# Patient Record
Sex: FEMALE | Race: WHITE | HISPANIC OR LATINO | URBAN - METROPOLITAN AREA
[De-identification: names, ages, dates, MRNs, and addresses within clinical notes are randomized per-mention and may not be internally consistent; named-entity substitution may affect disease eponyms.]

---

## 2021-05-04 ENCOUNTER — APPOINTMENT (RX ONLY)
Dept: URBAN - METROPOLITAN AREA CLINIC 123 | Facility: CLINIC | Age: 46
Setting detail: DERMATOLOGY
End: 2021-05-04

## 2021-05-04 DIAGNOSIS — B07.8 OTHER VIRAL WARTS: ICD-10-CM

## 2021-05-04 PROCEDURE — 17110 DESTRUCTION B9 LES UP TO 14: CPT

## 2021-05-04 PROCEDURE — ? FULL BODY SKIN EXAM - DECLINED

## 2021-05-04 PROCEDURE — ? ADDITIONAL NOTES

## 2021-05-04 PROCEDURE — ? COUNSELING

## 2021-05-04 PROCEDURE — ? LIQUID NITROGEN

## 2021-05-04 ASSESSMENT — LOCATION DETAILED DESCRIPTION DERM
LOCATION DETAILED: LEFT MID RADIAL DORSAL INDEX FINGER
LOCATION DETAILED: PERIUNGUAL SKIN RIGHT MIDDLE FINGER
LOCATION DETAILED: PERIUNGUAL SKIN LEFT THUMB
LOCATION DETAILED: PERIUNGUAL SKIN RIGHT THUMB
LOCATION DETAILED: LEFT DISTAL DORSAL MIDDLE FINGER
LOCATION DETAILED: LEFT DISTAL DORSAL SMALL FINGER
LOCATION DETAILED: RIGHT DISTAL ULNAR DORSAL RING FINGER
LOCATION DETAILED: LEFT DISTAL DORSAL RING FINGER

## 2021-05-04 ASSESSMENT — LOCATION ZONE DERM: LOCATION ZONE: FINGER

## 2021-05-04 ASSESSMENT — LOCATION SIMPLE DESCRIPTION DERM
LOCATION SIMPLE: RIGHT MIDDLE FINGER
LOCATION SIMPLE: LEFT INDEX FINGER
LOCATION SIMPLE: LEFT RING FINGER
LOCATION SIMPLE: RIGHT THUMB
LOCATION SIMPLE: RIGHT RING FINGER
LOCATION SIMPLE: LEFT THUMB
LOCATION SIMPLE: LEFT MIDDLE FINGER
LOCATION SIMPLE: LEFT SMALL FINGER

## 2021-05-25 ENCOUNTER — APPOINTMENT (RX ONLY)
Dept: URBAN - METROPOLITAN AREA CLINIC 123 | Facility: CLINIC | Age: 46
Setting detail: DERMATOLOGY
End: 2021-05-25

## 2021-05-25 DIAGNOSIS — B07.8 OTHER VIRAL WARTS: ICD-10-CM

## 2021-05-25 PROCEDURE — ? COUNSELING

## 2021-05-25 PROCEDURE — ? ADDITIONAL NOTES

## 2021-05-25 PROCEDURE — 17110 DESTRUCTION B9 LES UP TO 14: CPT

## 2021-05-25 PROCEDURE — ? FULL BODY SKIN EXAM - DECLINED

## 2021-05-25 PROCEDURE — ? LIQUID NITROGEN

## 2021-05-25 ASSESSMENT — LOCATION SIMPLE DESCRIPTION DERM
LOCATION SIMPLE: RIGHT MIDDLE FINGER
LOCATION SIMPLE: RIGHT THUMB
LOCATION SIMPLE: LEFT SMALL FINGER
LOCATION SIMPLE: RIGHT RING FINGER
LOCATION SIMPLE: RIGHT SMALL FINGER
LOCATION SIMPLE: RIGHT MIDDLE FINGERNAIL

## 2021-05-25 ASSESSMENT — LOCATION DETAILED DESCRIPTION DERM
LOCATION DETAILED: PERIUNGUAL SKIN RIGHT THUMB
LOCATION DETAILED: PERIUNGUAL SKIN RIGHT MIDDLE FINGER
LOCATION DETAILED: RIGHT DISTAL RADIAL DORSAL SMALL FINGER
LOCATION DETAILED: RIGHT MIDDLE FINGERNAIL
LOCATION DETAILED: LEFT DISTAL DORSAL SMALL FINGER
LOCATION DETAILED: RIGHT DISTAL ULNAR DORSAL RING FINGER

## 2021-05-25 ASSESSMENT — LOCATION ZONE DERM
LOCATION ZONE: FINGER
LOCATION ZONE: FINGERNAIL

## 2021-05-25 NOTE — PROCEDURE: LIQUID NITROGEN
Pared With?: 15 blade
Render Note In Bullet Format When Appropriate: No
Medical Necessity Clause: This procedure was medically necessary because the lesions that were treated were:
Medical Necessity Information: It is in your best interest to select a reason for this procedure from the list below. All of these items fulfill various CMS LCD requirements except the new and changing color options.
Post-Care Instructions: I reviewed with the patient in detail post-care instructions. Patient is to wear sunprotection, and avoid picking at any of the treated lesions. Pt may apply Vaseline to crusted or scabbing areas.
Detail Level: Detailed
Consent: The patient's consent was obtained including but not limited to risks of crusting, scabbing, blistering, scarring, darker or lighter pigmentary change, recurrence, incomplete removal and infection.

## 2021-06-22 ENCOUNTER — APPOINTMENT (RX ONLY)
Dept: URBAN - METROPOLITAN AREA CLINIC 123 | Facility: CLINIC | Age: 46
Setting detail: DERMATOLOGY
End: 2021-06-22

## 2021-06-22 DIAGNOSIS — B07.8 OTHER VIRAL WARTS: ICD-10-CM

## 2021-06-22 PROCEDURE — ? LIQUID NITROGEN

## 2021-06-22 PROCEDURE — ? FULL BODY SKIN EXAM - DECLINED

## 2021-06-22 PROCEDURE — ? COUNSELING

## 2021-06-22 PROCEDURE — 17110 DESTRUCTION B9 LES UP TO 14: CPT

## 2021-06-22 PROCEDURE — ? ADDITIONAL NOTES

## 2021-06-22 ASSESSMENT — LOCATION ZONE DERM: LOCATION ZONE: FINGER

## 2021-06-22 ASSESSMENT — LOCATION DETAILED DESCRIPTION DERM
LOCATION DETAILED: RIGHT DISTAL RADIAL DORSAL SMALL FINGER
LOCATION DETAILED: RIGHT DISTAL DORSAL MIDDLE FINGER
LOCATION DETAILED: RIGHT DISTAL DORSAL RING FINGER
LOCATION DETAILED: LEFT DISTAL RADIAL DORSAL SMALL FINGER
LOCATION DETAILED: PERIUNGUAL SKIN RIGHT THUMB
LOCATION DETAILED: PERIUNGUAL SKIN RIGHT MIDDLE FINGER
LOCATION DETAILED: RIGHT DISTAL RADIAL DORSAL RING FINGER

## 2021-06-22 ASSESSMENT — LOCATION SIMPLE DESCRIPTION DERM
LOCATION SIMPLE: RIGHT THUMB
LOCATION SIMPLE: LEFT SMALL FINGER
LOCATION SIMPLE: RIGHT RING FINGER
LOCATION SIMPLE: RIGHT SMALL FINGER
LOCATION SIMPLE: RIGHT MIDDLE FINGER

## 2021-07-22 ENCOUNTER — APPOINTMENT (RX ONLY)
Dept: URBAN - METROPOLITAN AREA CLINIC 123 | Facility: CLINIC | Age: 46
Setting detail: DERMATOLOGY
End: 2021-07-22

## 2021-07-22 DIAGNOSIS — B07.8 OTHER VIRAL WARTS: ICD-10-CM

## 2021-07-22 PROCEDURE — ? FULL BODY SKIN EXAM - DECLINED

## 2021-07-22 PROCEDURE — ? ADDITIONAL NOTES

## 2021-07-22 PROCEDURE — 17110 DESTRUCTION B9 LES UP TO 14: CPT

## 2021-07-22 PROCEDURE — ? LIQUID NITROGEN

## 2021-07-22 PROCEDURE — ? COUNSELING

## 2021-07-22 ASSESSMENT — LOCATION SIMPLE DESCRIPTION DERM
LOCATION SIMPLE: RIGHT SMALL FINGER
LOCATION SIMPLE: RIGHT THUMB
LOCATION SIMPLE: RIGHT RING FINGERNAIL
LOCATION SIMPLE: LEFT SMALL FINGERNAIL
LOCATION SIMPLE: RIGHT RING FINGER
LOCATION SIMPLE: RIGHT MIDDLE FINGER
LOCATION SIMPLE: LEFT MIDDLE FINGERNAIL
LOCATION SIMPLE: RIGHT MIDDLE FINGERNAIL

## 2021-07-22 ASSESSMENT — LOCATION DETAILED DESCRIPTION DERM
LOCATION DETAILED: LEFT MIDDLE FINGERNAIL
LOCATION DETAILED: PERIUNGUAL SKIN RIGHT THUMB
LOCATION DETAILED: RIGHT RING FINGERNAIL
LOCATION DETAILED: RIGHT DISTAL DORSAL MIDDLE FINGER
LOCATION DETAILED: RIGHT DISTAL DORSAL SMALL FINGER
LOCATION DETAILED: RIGHT MIDDLE FINGERNAIL
LOCATION DETAILED: PERIUNGUAL SKIN RIGHT RING FINGER
LOCATION DETAILED: LEFT SMALL FINGERNAIL

## 2021-07-22 ASSESSMENT — LOCATION ZONE DERM
LOCATION ZONE: FINGERNAIL
LOCATION ZONE: FINGER

## 2021-07-22 NOTE — PROCEDURE: LIQUID NITROGEN
Include Z78.9 (Other Specified Conditions Influencing Health Status) As An Associated Diagnosis?: No
Show Aperture Variable?: Yes
Duration Of Freeze Thaw-Cycle (Seconds): 10-15
Post-Care Instructions: I reviewed with the patient in detail post-care instructions. Patient is to wear sunprotection, and avoid picking at any of the treated lesions. Pt may apply Vaseline to crusted or scabbing areas.
Medical Necessity Clause: This procedure was medically necessary because the lesions that were treated were:
Number Of Freeze-Thaw Cycles: 3 freeze-thaw cycles
Medical Necessity Information: It is in your best interest to select a reason for this procedure from the list below. All of these items fulfill various CMS LCD requirements except the new and changing color options.
Pared With?: 15 blade
Consent: The patient's consent was obtained including but not limited to risks of crusting, scabbing, blistering, scarring, darker or lighter pigmentary change, recurrence, incomplete removal and infection.
Detail Level: Detailed

## 2021-09-09 ENCOUNTER — APPOINTMENT (RX ONLY)
Dept: URBAN - METROPOLITAN AREA CLINIC 123 | Facility: CLINIC | Age: 46
Setting detail: DERMATOLOGY
End: 2021-09-09

## 2021-09-09 DIAGNOSIS — B07.8 OTHER VIRAL WARTS: ICD-10-CM | Status: IMPROVED

## 2021-09-09 PROCEDURE — ? LIQUID NITROGEN

## 2021-09-09 PROCEDURE — 17110 DESTRUCTION B9 LES UP TO 14: CPT

## 2021-09-09 PROCEDURE — ? COUNSELING

## 2021-09-09 PROCEDURE — ? FULL BODY SKIN EXAM - DECLINED

## 2021-09-09 PROCEDURE — ? ADDITIONAL NOTES

## 2021-09-09 ASSESSMENT — LOCATION SIMPLE DESCRIPTION DERM
LOCATION SIMPLE: RIGHT SMALL FINGER
LOCATION SIMPLE: RIGHT MIDDLE FINGERNAIL
LOCATION SIMPLE: RIGHT RING FINGER
LOCATION SIMPLE: RIGHT RING FINGERNAIL
LOCATION SIMPLE: RIGHT MIDDLE FINGER
LOCATION SIMPLE: LEFT SMALL FINGERNAIL
LOCATION SIMPLE: RIGHT THUMB

## 2021-09-09 ASSESSMENT — LOCATION ZONE DERM
LOCATION ZONE: FINGERNAIL
LOCATION ZONE: FINGER

## 2021-09-09 ASSESSMENT — LOCATION DETAILED DESCRIPTION DERM
LOCATION DETAILED: RIGHT DISTAL DORSAL MIDDLE FINGER
LOCATION DETAILED: LEFT SMALL FINGERNAIL
LOCATION DETAILED: RIGHT DISTAL RADIAL DORSAL SMALL FINGER
LOCATION DETAILED: PERIUNGUAL SKIN RIGHT THUMB
LOCATION DETAILED: RIGHT RING FINGERNAIL
LOCATION DETAILED: PERIUNGUAL SKIN RIGHT RING FINGER
LOCATION DETAILED: RIGHT MIDDLE FINGERNAIL

## 2021-10-07 ENCOUNTER — APPOINTMENT (RX ONLY)
Dept: URBAN - METROPOLITAN AREA CLINIC 123 | Facility: CLINIC | Age: 46
Setting detail: DERMATOLOGY
End: 2021-10-07

## 2021-10-07 DIAGNOSIS — B07.8 OTHER VIRAL WARTS: ICD-10-CM | Status: IMPROVED

## 2021-10-07 PROCEDURE — ? COUNSELING

## 2021-10-07 PROCEDURE — ? FULL BODY SKIN EXAM - DECLINED

## 2021-10-07 PROCEDURE — ? LIQUID NITROGEN

## 2021-10-07 PROCEDURE — ? ADDITIONAL NOTES

## 2021-10-07 PROCEDURE — 17110 DESTRUCTION B9 LES UP TO 14: CPT

## 2021-10-07 ASSESSMENT — LOCATION DETAILED DESCRIPTION DERM
LOCATION DETAILED: RIGHT DISTAL RADIAL DORSAL SMALL FINGER
LOCATION DETAILED: RIGHT DISTAL DORSAL MIDDLE FINGER
LOCATION DETAILED: RIGHT RING FINGERNAIL
LOCATION DETAILED: RIGHT MIDDLE FINGERNAIL
LOCATION DETAILED: PERIUNGUAL SKIN RIGHT THUMB
LOCATION DETAILED: PERIUNGUAL SKIN RIGHT RING FINGER
LOCATION DETAILED: LEFT SMALL FINGERNAIL

## 2021-10-07 ASSESSMENT — LOCATION SIMPLE DESCRIPTION DERM
LOCATION SIMPLE: RIGHT RING FINGERNAIL
LOCATION SIMPLE: LEFT SMALL FINGERNAIL
LOCATION SIMPLE: RIGHT MIDDLE FINGER
LOCATION SIMPLE: RIGHT RING FINGER
LOCATION SIMPLE: RIGHT SMALL FINGER
LOCATION SIMPLE: RIGHT MIDDLE FINGERNAIL
LOCATION SIMPLE: RIGHT THUMB

## 2021-10-07 ASSESSMENT — LOCATION ZONE DERM
LOCATION ZONE: FINGERNAIL
LOCATION ZONE: FINGER

## 2021-10-07 NOTE — PROCEDURE: LIQUID NITROGEN
Include Z78.9 (Other Specified Conditions Influencing Health Status) As An Associated Diagnosis?: No
Show Aperture Variable?: Yes
Duration Of Freeze Thaw-Cycle (Seconds): 10-15
Post-Care Instructions: I reviewed with the patient in detail post-care instructions. Patient is to wear sunprotection, and avoid picking at any of the treated lesions. Pt may apply Vaseline to crusted or scabbing areas.
Medical Necessity Clause: This procedure was medically necessary because the lesions that were treated were:
Number Of Freeze-Thaw Cycles: 3 freeze-thaw cycles
Medical Necessity Information: It is in your best interest to select a reason for this procedure from the list below. All of these items fulfill various CMS LCD requirements except the new and changing color options.
Consent: The patient's consent was obtained including but not limited to risks of crusting, scabbing, blistering, scarring, darker or lighter pigmentary change, recurrence, incomplete removal and infection.
Detail Level: Detailed

## 2021-10-28 ENCOUNTER — APPOINTMENT (RX ONLY)
Dept: URBAN - METROPOLITAN AREA CLINIC 123 | Facility: CLINIC | Age: 46
Setting detail: DERMATOLOGY
End: 2021-10-28

## 2021-10-28 DIAGNOSIS — B07.8 OTHER VIRAL WARTS: ICD-10-CM

## 2021-10-28 PROCEDURE — ? COUNSELING

## 2021-10-28 PROCEDURE — ? FULL BODY SKIN EXAM - DECLINED

## 2021-10-28 PROCEDURE — ? ADDITIONAL NOTES

## 2021-10-28 PROCEDURE — ? LIQUID NITROGEN

## 2021-10-28 PROCEDURE — 17110 DESTRUCTION B9 LES UP TO 14: CPT

## 2021-10-28 ASSESSMENT — LOCATION SIMPLE DESCRIPTION DERM
LOCATION SIMPLE: RIGHT MIDDLE FINGERNAIL
LOCATION SIMPLE: RIGHT MIDDLE FINGER
LOCATION SIMPLE: RIGHT THUMB
LOCATION SIMPLE: LEFT SMALL FINGERNAIL
LOCATION SIMPLE: RIGHT RING FINGER
LOCATION SIMPLE: RIGHT SMALL FINGER
LOCATION SIMPLE: RIGHT RING FINGERNAIL

## 2021-10-28 ASSESSMENT — LOCATION DETAILED DESCRIPTION DERM
LOCATION DETAILED: PERIUNGUAL SKIN RIGHT RING FINGER
LOCATION DETAILED: RIGHT MIDDLE FINGERNAIL
LOCATION DETAILED: RIGHT DISTAL RADIAL DORSAL SMALL FINGER
LOCATION DETAILED: RIGHT DISTAL DORSAL MIDDLE FINGER
LOCATION DETAILED: LEFT SMALL FINGERNAIL
LOCATION DETAILED: PERIUNGUAL SKIN RIGHT THUMB
LOCATION DETAILED: RIGHT RING FINGERNAIL

## 2021-10-28 ASSESSMENT — LOCATION ZONE DERM
LOCATION ZONE: FINGERNAIL
LOCATION ZONE: FINGER

## 2021-11-18 ENCOUNTER — APPOINTMENT (RX ONLY)
Dept: URBAN - METROPOLITAN AREA CLINIC 123 | Facility: CLINIC | Age: 46
Setting detail: DERMATOLOGY
End: 2021-11-18

## 2021-11-18 DIAGNOSIS — B07.8 OTHER VIRAL WARTS: ICD-10-CM

## 2021-11-18 PROCEDURE — 17110 DESTRUCTION B9 LES UP TO 14: CPT

## 2021-11-18 PROCEDURE — ? FULL BODY SKIN EXAM - DECLINED

## 2021-11-18 PROCEDURE — ? ADDITIONAL NOTES

## 2021-11-18 PROCEDURE — ? COUNSELING

## 2021-11-18 PROCEDURE — ? LIQUID NITROGEN

## 2021-11-18 ASSESSMENT — LOCATION SIMPLE DESCRIPTION DERM
LOCATION SIMPLE: RIGHT SMALL FINGER
LOCATION SIMPLE: RIGHT MIDDLE FINGER
LOCATION SIMPLE: RIGHT SMALL FINGERNAIL
LOCATION SIMPLE: RIGHT RING FINGER
LOCATION SIMPLE: RIGHT THUMB
LOCATION SIMPLE: RIGHT THUMBNAIL
LOCATION SIMPLE: LEFT RING FINGER
LOCATION SIMPLE: LEFT SMALL FINGER

## 2021-11-18 ASSESSMENT — LOCATION DETAILED DESCRIPTION DERM
LOCATION DETAILED: RIGHT THUMB LUNULA
LOCATION DETAILED: PERIUNGUAL SKIN RIGHT THUMB
LOCATION DETAILED: PERIUNGUAL SKIN LEFT SMALL FINGER
LOCATION DETAILED: LEFT DISTAL DORSAL SMALL FINGER
LOCATION DETAILED: RIGHT MIDDLE FINGER DISTAL INTERPHALANGEAL JOINT
LOCATION DETAILED: RIGHT DISTAL RADIAL DORSAL MIDDLE FINGER
LOCATION DETAILED: LEFT DISTAL RADIAL DORSAL RING FINGER
LOCATION DETAILED: RIGHT DISTAL ULNAR DORSAL RING FINGER
LOCATION DETAILED: RIGHT SMALL FINGERNAIL
LOCATION DETAILED: RIGHT DISTAL DORSAL RING FINGER
LOCATION DETAILED: RIGHT DISTAL RADIAL DORSAL SMALL FINGER

## 2021-11-18 ASSESSMENT — LOCATION ZONE DERM
LOCATION ZONE: FINGER
LOCATION ZONE: FINGERNAIL

## 2021-11-18 NOTE — PROCEDURE: LIQUID NITROGEN
Include Z78.9 (Other Specified Conditions Influencing Health Status) As An Associated Diagnosis?: No
Show Topical Anesthesia Variable?: Yes
Post-Care Instructions: I reviewed with the patient in detail post-care instructions. Patient is to wear sunprotection, and avoid picking at any of the treated lesions. Pt may apply Vaseline to crusted or scabbing areas.
Consent: The patient's consent was obtained including but not limited to risks of crusting, scabbing, blistering, scarring, darker or lighter pigmentary change, recurrence, incomplete removal and infection.
Detail Level: Detailed
Medical Necessity Clause: This procedure was medically necessary because the lesions that were treated were:
Medical Necessity Information: It is in your best interest to select a reason for this procedure from the list below. All of these items fulfill various CMS LCD requirements except the new and changing color options.

## 2021-12-16 ENCOUNTER — APPOINTMENT (RX ONLY)
Dept: URBAN - METROPOLITAN AREA CLINIC 123 | Facility: CLINIC | Age: 46
Setting detail: DERMATOLOGY
End: 2021-12-16

## 2021-12-16 DIAGNOSIS — B07.8 OTHER VIRAL WARTS: ICD-10-CM

## 2021-12-16 PROCEDURE — 17110 DESTRUCTION B9 LES UP TO 14: CPT

## 2021-12-16 PROCEDURE — ? COUNSELING

## 2021-12-16 PROCEDURE — ? FULL BODY SKIN EXAM - DECLINED

## 2021-12-16 PROCEDURE — ? LIQUID NITROGEN

## 2021-12-16 PROCEDURE — ? ADDITIONAL NOTES

## 2021-12-16 ASSESSMENT — LOCATION DETAILED DESCRIPTION DERM
LOCATION DETAILED: PERIUNGUAL SKIN LEFT SMALL FINGER
LOCATION DETAILED: PERIUNGUAL SKIN RIGHT THUMB
LOCATION DETAILED: RIGHT THUMB LUNULA
LOCATION DETAILED: LEFT DISTAL DORSAL SMALL FINGER
LOCATION DETAILED: RIGHT DISTAL DORSAL RING FINGER
LOCATION DETAILED: RIGHT DISTAL RADIAL DORSAL SMALL FINGER
LOCATION DETAILED: RIGHT DISTAL RADIAL DORSAL MIDDLE FINGER
LOCATION DETAILED: RIGHT SMALL FINGERNAIL
LOCATION DETAILED: LEFT DISTAL RADIAL DORSAL RING FINGER
LOCATION DETAILED: RIGHT DISTAL ULNAR DORSAL RING FINGER
LOCATION DETAILED: RIGHT MIDDLE FINGER DISTAL INTERPHALANGEAL JOINT

## 2021-12-16 ASSESSMENT — LOCATION SIMPLE DESCRIPTION DERM
LOCATION SIMPLE: LEFT SMALL FINGER
LOCATION SIMPLE: RIGHT RING FINGER
LOCATION SIMPLE: RIGHT SMALL FINGERNAIL
LOCATION SIMPLE: RIGHT MIDDLE FINGER
LOCATION SIMPLE: RIGHT THUMB
LOCATION SIMPLE: RIGHT THUMBNAIL
LOCATION SIMPLE: RIGHT SMALL FINGER
LOCATION SIMPLE: LEFT RING FINGER

## 2021-12-16 ASSESSMENT — LOCATION ZONE DERM
LOCATION ZONE: FINGER
LOCATION ZONE: FINGERNAIL